# Patient Record
Sex: MALE | Race: BLACK OR AFRICAN AMERICAN | Employment: FULL TIME | URBAN - METROPOLITAN AREA
[De-identification: names, ages, dates, MRNs, and addresses within clinical notes are randomized per-mention and may not be internally consistent; named-entity substitution may affect disease eponyms.]

---

## 2019-06-08 ENCOUNTER — THERAPY VISIT (OUTPATIENT)
Dept: PHYSICAL THERAPY | Facility: CLINIC | Age: 54
End: 2019-06-08
Payer: COMMERCIAL

## 2019-06-08 DIAGNOSIS — M54.2 CERVICAL PAIN: ICD-10-CM

## 2019-06-08 DIAGNOSIS — Z47.89 AFTERCARE FOLLOWING SURGERY OF THE MUSCULOSKELETAL SYSTEM: ICD-10-CM

## 2019-06-08 DIAGNOSIS — M62.81 MUSCLE WEAKNESS OF LEFT UPPER EXTREMITY: ICD-10-CM

## 2019-06-08 PROCEDURE — 97112 NEUROMUSCULAR REEDUCATION: CPT | Mod: GP | Performed by: PHYSICAL THERAPIST

## 2019-06-08 PROCEDURE — 97161 PT EVAL LOW COMPLEX 20 MIN: CPT | Mod: GP | Performed by: PHYSICAL THERAPIST

## 2019-06-08 PROCEDURE — 97110 THERAPEUTIC EXERCISES: CPT | Mod: GP | Performed by: PHYSICAL THERAPIST

## 2019-06-08 NOTE — PROGRESS NOTES
[facility] Initial Evaluation -- Cervical    Evaluation Date: June 8, 2019  Woody Smith is a 53 year old male with a post-op decompression left C6-C7 condition.   Referral: Dr. Yates  Work mechanical stresses: computer work, buisness analyst   Employment status: working with no restrictions. Difficulty using left.   Leisure mechanical stresses: opening doors, likes to lift weights. Arm feels cramping.   Functional disability score (NDI):  See chart  VAS score (0-10): 1  Patient goals/expectations:  Get his arm strength back,     HISTORY:    Present symptoms:  Upper trap / lateral shoulder.  Pain quality (sharp/shooting/stabbing/aching/burning/cramping):   aching.  Paresthesia (yes/no):  Yes, medial three fingers    Present since (onset date): 5/8/19 date of surgery.     Symptoms (improving/unchanging/worsening):  Improving: pain is gone but the weakness unchanged.    Symptoms commenced as a result of: no reason   Condition occurred in the following environment:  unknown    Symptoms at onset (neck/arm/forearm/headache): right / left shoulder and neck, then progressed into his left hand  Constant symptoms (neck/arm/forearm/headache): none  Intermittent symptoms (neck/arm/forearm/headache): left shoulder    Symptoms are made worse with the following: working on the keyboard, sitting for prolonged periods of time   Symptoms are made better with the following: icing    Disturbed sleep (yes/no): no  Number of pillows: 1  Sleeping postures (prone/sup/side R/L): back    Previous episodes (0/1-5/6-10/11+): 0 Year of first episode: this is first     Previous history: surgery C6-C7  Previous treatments: none    Specific Questions: (as reported by the patient)  Dizziness/Tinnitus/Nausea/Swallowing (pos/neg): neg  Gait/Upper Limbs (normal/abnormal): abnormal  Medications (nil/NSAIDS/anlag/steroids/anticoag/other):  Other - High blood pressure  Medical allergies:  none  General health  (excellent/good/fair/poor):  fair  Pertinent medical history:  High blood pressure, Overweight, Sleep disorder/apnea and weakness  Imaging (None/Xray/MRI/Other):  EMG, MRI,   Recent or major surgery (yes/no): yes  Night pain (yes/no): no  Accidents (yes/no): no  Unexplained weight loss (yes/no): no  Barriers at home: none  Other red flags: none    EXAMINATION    Posture:   Sitting (good/fair/poor): fair  Standing (good/fair/poor): good.      Protruded head (yes/no): no    Wry Neck (right/left/nil):  no  Relevant (yes/no):  no     Correction of posture(better/worse/no effect): better  Other observations:  none    Neurological:    Motor Deficit:  C7 weakness, wrist flexion 3/5, tricep extension 3+/5, wrist extension 4/5, finger extension 3/5, shoulder abd 4/5,  strength left 50lbs, right 110lbs     Sensory Deficit:  Hyposenation C7,        Movement Loss:   Esteban Mod Min Nil Pain   Protrusion    x Posterior neck pain   Flexion   x  No pain   Retraction    x No pain   Extension   x  Slight posterior neck discomfort   Lateral flexion R    x No pain   Lateral flexion L   x  Slight pain left shoulder blade   Rotation R    x No pain   Rotation L   x  No pain     Test Movements:   During: produces, abolishes, increases, decreases, no effect, centralizing, peripheralizing  After: better, worse, no better, no worse, no effect, centralized, peripheralized    Pretest symptoms sitting: no pain, just weak   Symptoms During Symptoms After ROM increased ROM decreased No Effect   PRO          Rep PRO          RET No Effect    No Effect         Rep RET No Effect    No Effect    X improved left SB/ Rotation     RET EXT No Effect    No Effect         Rep RET EXT Produces   left cervical pinch No Better    X  slgith  strenght improvement to 60lbs.                Other Tests: noticeable loss of girth in upper / lower arm.      Provisional Classification:  Inconclusive/Other - Post-Surgery    Principle of Management:  Education:   Exercises compliance desk ergonomics    Equipment provided:  Lumbar support to improve posture  Mechanical therapy (Y/N):  yes   Extension principle:  Repeated extension x 10 reps start 3-4 x / day and progress to 6-8 if symptoms improving   Lateral principle:  none  Flexion principle:  none   Other:  UE strengthening    ASSESSMENT/PLAN:    Patient is a 53 year old male with cervical complaints.    Patient has the following significant findings with corresponding treatment plan.                Diagnosis 1:  Cervical pain / left upper extremity weakness s/p C6-C7 cervical decompression  Pain -  hot/cold therapy, manual therapy, education, directional preference exercise and home program  Decreased ROM/flexibility - manual therapy, therapeutic exercise, therapeutic activity and home program  Decreased strength - therapeutic exercise, therapeutic activities and home program  Impaired muscle performance - neuro re-education and home program  Decreased function - therapeutic activities and home program  Impaired posture - neuro re-education, therapeutic activities and home program    Therapy Evaluation Codes:   1) History comprised of:   Personal factors that impact the plan of care:      None.    Comorbidity factors that impact the plan of care are:      Overweight and Weakness.     Medications impacting care: High blood pressure.  2) Examination of Body Systems comprised of:   Body structures and functions that impact the plan of care:      Cervical spine, Elbow, Fingers, Hand, Shoulder and Thumb.   Activity limitations that impact the plan of care are:      Lifting and Working.  3) Clinical presentation characteristics are:   Stable/Uncomplicated.  4) Decision-Making    Low complexity using standardized patient assessment instrument and/or measureable assessment of functional outcome.  Cumulative Therapy Evaluation is: Low complexity.    Previous and current functional limitations:  (See Goal Flow Sheet for this  information)    Short term and Long term goals: (See Goal Flow Sheet for this information)     Communication ability:  Patient appears to be able to clearly communicate and understand verbal and written communication and follow directions correctly.  Treatment Explanation - The following has been discussed with the patient:   RX ordered/plan of care  Anticipated outcomes  Possible risks and side effects  This patient would benefit from PT intervention to resume normal activities.   Rehab potential is excellent.    Frequency:  1 X week, once daily  Duration:  for 6 weeks tapering to 1 X / 2 weeks over 12 weeks  Discharge Plan:  Achieve all LTG.  Independent in home treatment program.  Reach maximal therapeutic benefit.    Please refer to the daily flowsheet for treatment today, total treatment time and time spent performing 1:1 timed codes.

## 2019-06-08 NOTE — LETTER
[facility] PHYSICAL THERAPY  [address]  [facility] [address]  [phone]    Susy 10, 2019    Re: Woody Smith   :   1965  MRN:  [id number]   REFERRING PHYSICIAN:   Umesh Yates    [facility] PHYSICAL [facility]ER[facility]    Date of Initial Evaluation: 19  Visits:  Rxs Used: 1  Reason for Referral:     Cervical pain  Aftercare following surgery of the musculoskeletal system  Muscle weakness of left upper extremity    EVALUATION SUMMARY    [facility] Initial Evaluation -- Cervical    Evaluation Date: 2019  Woody Smith is a 53 year old male with a post-op decompression left C6-C7 condition.   Referral: Dr. Yates  Work mechanical stresses: computer work, buisness analyst   Employment status: working with no restrictions. Difficulty using left.   Leisure mechanical stresses: opening doors, likes to lift weights. Arm feels cramping.   Functional disability score (NDI):  See chart  VAS score (0-10): 1  Patient goals/expectations:  Get his arm strength back,     HISTORY:    Present symptoms:  Upper trap / lateral shoulder.  Pain quality (sharp/shooting/stabbing/aching/burning/cramping):   aching.  Paresthesia (yes/no):  Yes, medial three fingers    Present since (onset date): 19 date of surgery.     Symptoms (improving/unchanging/worsening):  Improving: pain is gone but the weakness unchanged.    Symptoms commenced as a result of: no reason   Condition occurred in the following environment:  unknown    Symptoms at onset (neck/arm/forearm/headache): right / left shoulder and neck, then progressed into his left hand  Constant symptoms (neck/arm/forearm/headache): none  Intermittent symptoms (neck/arm/forearm/headache): left shoulder    Symptoms are made worse with the following: working on the keyboard, sitting for prolonged periods of time   Symptoms are made better with the following: icing    Disturbed sleep (yes/no):  no  Number of pillows: 1  Sleeping postures (prone/sup/side R/L): back    Previous episodes (0/1-5/6-10/11+): 0 Year of first episode: this is first     Previous history: surgery C6-C7  Previous treatments: none    Specific Questions: (as reported by the patient)  Dizziness/Tinnitus/Nausea/Swallowing (pos/neg): neg  Gait/Upper Limbs (normal/abnormal): abnormal  Medications (nil/NSAIDS/anlag/steroids/anticoag/other):  Other - High blood pressure  Medical allergies:  none  General health (excellent/good/fair/poor):  fair  Pertinent medical history:  High blood pressure, Overweight, Sleep disorder/apnea and weakness  Imaging (None/Xray/MRI/Other):  EMG, MRI,   Recent or major surgery (yes/no): yes  Night pain (yes/no): no  Accidents (yes/no): no  Unexplained weight loss (yes/no): no  Barriers at home: none  Other red flags: none    EXAMINATION    Posture:   Sitting (good/fair/poor): fair  Standing (good/fair/poor): good.      Protruded head (yes/no): no    Wry Neck (right/left/nil):  no  Relevant (yes/no):  no     Correction of posture(better/worse/no effect): better  Other observations:  none    Neurological:    Motor Deficit:  C7 weakness, wrist flexion 3/5, tricep extension 3+/5, wrist extension 4/5, finger extension 3/5, shoulder abd 4/5,  strength left 50lbs, right 110lbs     Sensory Deficit:  Hyposenation C7,        Movement Loss:   Esteban Mod Min Nil Pain   Protrusion    x Posterior neck pain   Flexion   x  No pain   Retraction    x No pain   Extension   x  Slight posterior neck discomfort   Lateral flexion R    x No pain   Lateral flexion L   x  Slight pain left shoulder blade   Rotation R    x No pain   Rotation L   x  No pain     Test Movements:   During: produces, abolishes, increases, decreases, no effect, centralizing, peripheralizing  After: better, worse, no better, no worse, no effect, centralized, peripheralized    Pretest symptoms sitting: no pain, just weak   Symptoms During Symptoms After ROM  increased ROM decreased No Effect   PRO          Rep PRO          RET No Effect    No Effect         Rep RET No Effect    No Effect    X improved left SB/ Rotation     RET EXT No Effect    No Effect         Rep RET EXT Produces   left cervical pinch No Better    X  slgith  strenght improvement to 60lbs.        Other Tests: noticeable loss of girth in upper / lower arm.      Provisional Classification:  Inconclusive/Other - Post-Surgery    Principle of Management:  Education:  Exercises compliance desk ergonomics    Equipment provided:  Lumbar support to improve posture  Mechanical therapy (Y/N):  yes   Extension principle:  Repeated extension x 10 reps start 3-4 x / day and progress to 6-8 if symptoms improving   Lateral principle:  none  Flexion principle:  none   Other:  UE strengthening    ASSESSMENT/PLAN:  Patient is a 53 year old male with cervical complaints.    Patient has the following significant findings with corresponding treatment plan.                  Diagnosis 1:  Cervical pain / left upper extremity weakness s/p C6-C7 cervical decompression  Pain -  hot/cold therapy, manual therapy, education, directional preference exercise and home program  Decreased ROM/flexibility - manual therapy, therapeutic exercise, therapeutic activity and home program  Decreased strength - therapeutic exercise, therapeutic activities and home program  Impaired muscle performance - neuro re-education and home program  Decreased function - therapeutic activities and home program  Impaired posture - neuro re-education, therapeutic activities and home program    Therapy Evaluation Codes:   1) History comprised of:   Personal factors that impact the plan of care:      None.    Comorbidity factors that impact the plan of care are:      Overweight and Weakness.     Medications impacting care: High blood pressure.  2) Examination of Body Systems comprised of:   Body structures and functions that impact the plan of care:       Cervical spine, Elbow, Fingers, Hand, Shoulder and Thumb.   Activity limitations that impact the plan of care are:      Lifting and Working.  3) Clinical presentation characteristics are:   Stable/Uncomplicated.  4) Decision-Making    Low complexity using standardized patient assessment instrument and/or measureable assessment of functional outcome.  Cumulative Therapy Evaluation is: Low complexity.    Previous and current functional limitations:  (See Goal Flow Sheet for this information)    Short term and Long term goals: (See Goal Flow Sheet for this information)     Communication ability:  Patient appears to be able to clearly communicate and understand verbal and written communication and follow directions correctly.    Treatment Explanation - The following has been discussed with the patient:   RX ordered/plan of care  Anticipated outcomes  Possible risks and side effects    This patient would benefit from PT intervention to resume normal activities.   Rehab potential is excellent.  Frequency:  1 X week, once daily  Duration:  for 6 weeks tapering to 1 X / 2 weeks over 12 weeks  Discharge Plan:  Achieve all LTG.  Independent in home treatment program.  Reach maximal therapeutic benefit.    Please refer to the daily flowsheet for treatment today, total treatment time and time spent performing 1:1 timed codes.      Thank you for your referral.    INQUIRIES  Therapist: Jamaal Estevez DPT  [facility] PHYSICAL THERAPY  [address]  [facility] [address]  Phone: [phone]  Fax: [phone]

## 2019-06-17 ENCOUNTER — THERAPY VISIT (OUTPATIENT)
Dept: OCCUPATIONAL THERAPY | Facility: CLINIC | Age: 54
End: 2019-06-17
Payer: COMMERCIAL

## 2019-06-17 DIAGNOSIS — R29.898 LEFT HAND WEAKNESS: ICD-10-CM

## 2019-06-17 DIAGNOSIS — M79.642 PAIN OF LEFT HAND: ICD-10-CM

## 2019-06-17 PROCEDURE — 97165 OT EVAL LOW COMPLEX 30 MIN: CPT | Mod: GO | Performed by: OCCUPATIONAL THERAPIST

## 2019-06-17 PROCEDURE — 97110 THERAPEUTIC EXERCISES: CPT | Mod: GO | Performed by: OCCUPATIONAL THERAPIST

## 2019-06-17 PROCEDURE — 97112 NEUROMUSCULAR REEDUCATION: CPT | Mod: GO | Performed by: OCCUPATIONAL THERAPIST

## 2019-06-17 NOTE — LETTER
[facility]  [address]  [address]  [facility] [address]  [phone]    2019    Re: Woody Smith   :   1965  MRN:  [id number]   REFERRING PHYSICIAN:   Umesh Yates    [facility]    Date of Initial Evaluation:  2019  Visits: 1 Rxs Used: 1  Reason for Referral:     Left hand weakness  Pain of left hand    Hand Therapy Initial Evaluation  Current Date:  2019  Referring Physician: Dr. Yates    Diagnosis: Left hand weakness/pain  DOS: 19  Procedure: C6 & C7 decompression  Post:  4w 2d    Subjective:  Woody Smith is a 53 year old right hand dominant male.    Patient reports symptoms of pain, weakness/loss of strength, numbness and tingling  of the left hand which occurred due to above stated surgery. Since onset symptoms are Gradually getting better.  Special tests:  EMG and MRI.  Previous treatment: none.    General health as reported by patient is fair.  Pertinent medical history includes:High Blood Pressure, Numbness/Tingling, Overweight, Sleep Disorder/Apnea, Significant Weakness  Medical allergies:none.  Surgical history: orthopedic: cervical.  Medication history: Anti-inflammatory, High Blood Pressure.    Occupational Profile Information:  Current occupation is   Currently working in normal job without restrictions  Job Tasks: Computer Work, Prolonged Sitting  Prior functional level:  no limitations  Barriers include:none  Mobility: No difficulty  Transportation: drives  Leisure activities/hobbies: reading, gym, choir    Upper Extremity Functional Index Score:  SCORE:   Column Totals: /80: 48   Re: Woody Smith   :   1965  (A lower score indicates greater disability.)    Objective:  Pain Level (Scale 0-10):   2019   At Rest 1/10   With Use 4/10     Pain Description:  Date 2019   Location Hand and radiating into forearm   Pain Quality Aching and Throbbing   Frequency intermittent     Pain is  worst  daytime   Exacerbated by  typing, positional   Relieved by Cold on neck and rest   Progression Gradually improving      ROM:   Pain Report:  + mild    ++ moderate    +++ severe   Wrist 2019   AROM (PROM) Right Left   Extension 60 60   Flexion 75 70   RD 25 15   UD 50 0   Supination 75 85   Pronation 85 90     ROM  Hand 2019    AROM(PROM) Left    Index MP -40/80 /   PIP 0/100 /   DIP /50 /   BANKS     Long MP -50/90 /   PIP -15/95 /   DIP /60 /   BANKS     Ring MP -45/90 /   PIP -35/95 /   DIP /40 /   BANKS     Small MP -30/90 /   PIP -30/95 /   DIP /60 /   BANKS     Re: Woody Smith   :   1965  Lacks finger and thumb  Add/add    Strength   Painfree (Measured in pounds)  Pain Report:  + mild    ++ moderate    +++ severe    2019   Trials Right Left   1  2  3 116  123  121 52  43  44   Average 120 46     Lat Pinch 2019   Trials Right Left   1  2  3 30+ 5   Average       3 Pt Pinch 2019   Trials Right  Left   1  2  3 24 5   Average         Edema:  [x]         None   []         Mild    []         Moderate      []         Severe                    Color/Temperature:     [x]        Normal  []        Abnormal    Palpation:  [x]         Normal        []       Tender       []      Mild         []       Moderate []       Severe       Sensation: []                   WNL throughout all nerve distributions; per patient report    [x]                   Decreased  []        Median    [x]        Ulnar    [x]        Radial nerve distribution  Sensation: Decreased Ulnar Nerve distribution    Numbness/Tingling Level Report  VAS(0-10) 2019   At Rest: 6/10   With Use: 10/10     Special Tests:  Pain Report:  - none    + mild    ++ moderate    +++ severe    2019   Froment's Test positive   Re: Woody Smith   :   1965  Ulnar Nerve MMT: (Scale 0/5)   2019   Finger Adduction 3-/5   Finger Abduction 3-/5   Adductor Pollicis 3-/5        Assessment:  Patient presents with symptoms consistent with diagnosis of hand weakness,  with surgical  intervention.     Patient's limitations or Problem List includes:  Pain, Decreased ROM/motion, Weakness, Sensory disturbance, Decreased , Decreased pinch, Decreased coordination and Decreased dexterity of the left hand which interferes with the patient's ability to perform Self Care Tasks (dressing, bathing), Work Tasks, Recreational Activities and Household Chores as compared to previous level of function.    Rehab Potential:  Excellent - Return to full activity, no limitations    Patient will benefit from skilled Occupational Therapy to increase ROM, overall strength,  strength, pinch strength, forearm strength, coordination, dexterity and sensation and decrease pain to return to previous activity level and resume normal daily tasks and to reach their rehab potential.    Barriers to Learning:  No barrier    Communication Issues:  Patient appears to be able to clearly communicate and understand verbal and written communication and follow directions correctly.    Chart Review: Brief history including review of medical and/or therapy records relating to the presenting problem and Simple history review with patient    Identified Performance Deficits: bathing/showering, dressing, home establishment and management, meal preparation and cleanup, work and leisure activities    Assessment of Occupational Performance:  5 or more Performance Deficits  Clinical Decision Making (Complexity): Low complexity    Treatment Explanation:  The following has been discussed with the patient:  RX ordered/plan of care  Anticipated outcomes  Possible risks and side effects    Frequency:  2-3 X a month, once daily  Duration:  for 6 months  Treatment Plan:  Modalities:  Fluidotherapy and Paraffin  Therapeutic Exercise:  AROM, AAROM, PROM, Tendon Gliding, Reverse Blocking, Place and Hold, Isotonics, Isometrics and  Stabilization  Neuromuscular re-education:  Sensory re-education and Desensitization  Discharge Plan:  Achieve all LTG.  Independent in home treatment program.  Reach maximal therapeutic benefit.  Re: Woody Smith   :   1965  Home Exercise Program:  AROM of fingers and wrist  Desensitization    Next Visit:  Check ROM and advance as indicated    Thank you for your referral.    INQUIRIES  Therapist: YAENTH Lowe/L  [facility]  [address]  [place]   [facility] [address]  Phone: [phone]  Fax: [phone]

## 2019-06-17 NOTE — PROGRESS NOTES
Hand Therapy Initial Evaluation    Current Date:  6/17/2019  Referring Physician: Dr. Yates    Diagnosis: Left hand weakness/pain  DOS: 5/8/19  Procedure: C6 & C7 decompression  Post:  4w 2d    Subjective:  Woody Smith is a 53 year old right hand dominant male.    Patient reports symptoms of pain, weakness/loss of strength, numbness and tingling  of the left hand which occurred due to above stated surgery. Since onset symptoms are Gradually getting better.  Special tests:  EMG and MRI.  Previous treatment: none.    General health as reported by patient is fair.  Pertinent medical history includes:High Blood Pressure, Numbness/Tingling, Overweight, Sleep Disorder/Apnea, Significant Weakness  Medical allergies:none.  Surgical history: orthopedic: cervical.  Medication history: Anti-inflammatory, High Blood Pressure.    Occupational Profile Information:  Current occupation is   Currently working in normal job without restrictions  Job Tasks: Computer Work, Prolonged Sitting  Prior functional level:  no limitations  Barriers include:none  Mobility: No difficulty  Transportation: drives  Leisure activities/hobbies: reading, gym, choir    Upper Extremity Functional Index Score:  SCORE:   Column Totals: /80: 48   (A lower score indicates greater disability.)    Objective:  Pain Level (Scale 0-10):   6/17/2019   At Rest 1/10   With Use 4/10     Pain Description:  Date 6/17/2019   Location Hand and radiating into forearm   Pain Quality Aching and Throbbing   Frequency intermittent     Pain is worst  daytime   Exacerbated by  typing, positional   Relieved by Cold on neck and rest   Progression Gradually improving      ROM:   Pain Report:  + mild    ++ moderate    +++ severe   Wrist 6/17/2019 6/17/2019   AROM (PROM) Right Left   Extension 60 60   Flexion 75 70   RD 25 15   UD 50 0   Supination 75 85   Pronation 85 90     ROM  Hand 6/17/2019    AROM(PROM) Left    Index MP -40/80 /   PIP 0/100 /   DIP /50 /    BANKS     Long MP -50/90 /   PIP -15/95 /   DIP /60 /   BANKS     Ring MP -45/90 /   PIP -35/95 /   DIP /40 /   BANKS     Small MP -30/90 /   PIP -30/95 /   DIP /60 /   BANKS       Lacks finger and thumb  Add/add    Strength   Painfree (Measured in pounds)  Pain Report:  + mild    ++ moderate    +++ severe    6/17/2019 6/17/2019   Trials Right Left   1  2  3 116  123  121 52  43  44   Average 120 46     Lat Pinch 6/17/2019 6/17/2019   Trials Right Left   1  2  3 30+ 5   Average       3 Pt Pinch 6/17/2019 6/17/2019   Trials Right  Left   1  2  3 24 5   Average         Edema:  [x]         None   []         Mild    []         Moderate      []         Severe                    Color/Temperature:     [x]        Normal  []        Abnormal    Palpation:  [x]         Normal        []       Tender       []      Mild         []       Moderate []       Severe       Sensation: []                   WNL throughout all nerve distributions; per patient report    [x]                   Decreased  []        Median    [x]        Ulnar    [x]        Radial nerve distribution  Sensation: Decreased Ulnar Nerve distribution    Numbness/Tingling Level Report  VAS(0-10) 6/17/2019   At Rest: 6/10   With Use: 10/10     Special Tests:  Pain Report:  - none    + mild    ++ moderate    +++ severe    6/17/2019   Froment's Test positive     Ulnar Nerve MMT: (Scale 0/5)   6/17/2019   Finger Adduction 3-/5   Finger Abduction 3-/5   Adductor Pollicis 3-/5       Assessment:  Patient presents with symptoms consistent with diagnosis of hand weakness,  with surgical  intervention.     Patient's limitations or Problem List includes:  Pain, Decreased ROM/motion, Weakness, Sensory disturbance, Decreased , Decreased pinch, Decreased coordination and Decreased dexterity of the left hand which interferes with the patient's ability to perform Self Care Tasks (dressing, bathing), Work Tasks, Recreational Activities and Household Chores as compared to previous  level of function.    Rehab Potential:  Excellent - Return to full activity, no limitations    Patient will benefit from skilled Occupational Therapy to increase ROM, overall strength,  strength, pinch strength, forearm strength, coordination, dexterity and sensation and decrease pain to return to previous activity level and resume normal daily tasks and to reach their rehab potential.    Barriers to Learning:  No barrier    Communication Issues:  Patient appears to be able to clearly communicate and understand verbal and written communication and follow directions correctly.    Chart Review: Brief history including review of medical and/or therapy records relating to the presenting problem and Simple history review with patient    Identified Performance Deficits: bathing/showering, dressing, home establishment and management, meal preparation and cleanup, work and leisure activities    Assessment of Occupational Performance:  5 or more Performance Deficits  Clinical Decision Making (Complexity): Low complexity    Treatment Explanation:  The following has been discussed with the patient:  RX ordered/plan of care  Anticipated outcomes  Possible risks and side effects    Frequency:  2-3 X a month, once daily  Duration:  for 6 months  Treatment Plan:  Modalities:  Fluidotherapy and Paraffin  Therapeutic Exercise:  AROM, AAROM, PROM, Tendon Gliding, Reverse Blocking, Place and Hold, Isotonics, Isometrics and Stabilization  Neuromuscular re-education:  Sensory re-education and Desensitization  Discharge Plan:  Achieve all LTG.  Independent in home treatment program.  Reach maximal therapeutic benefit.      Home Exercise Program:  AROM of fingers and wrist  Desensitization    Next Visit:  Check ROM and advance as indicated

## 2019-06-19 ENCOUNTER — THERAPY VISIT (OUTPATIENT)
Dept: PHYSICAL THERAPY | Facility: CLINIC | Age: 54
End: 2019-06-19
Payer: COMMERCIAL

## 2019-06-19 DIAGNOSIS — M54.2 CERVICAL PAIN: ICD-10-CM

## 2019-06-19 DIAGNOSIS — M62.81 MUSCLE WEAKNESS OF LEFT UPPER EXTREMITY: ICD-10-CM

## 2019-06-19 DIAGNOSIS — Z47.89 AFTERCARE FOLLOWING SURGERY OF THE MUSCULOSKELETAL SYSTEM: ICD-10-CM

## 2019-06-19 PROCEDURE — 97110 THERAPEUTIC EXERCISES: CPT | Mod: GP | Performed by: PHYSICAL THERAPIST

## 2019-06-19 PROCEDURE — 97140 MANUAL THERAPY 1/> REGIONS: CPT | Mod: GP | Performed by: PHYSICAL THERAPIST

## 2019-06-28 ENCOUNTER — THERAPY VISIT (OUTPATIENT)
Dept: PHYSICAL THERAPY | Facility: CLINIC | Age: 54
End: 2019-06-28
Payer: COMMERCIAL

## 2019-06-28 ENCOUNTER — THERAPY VISIT (OUTPATIENT)
Dept: OCCUPATIONAL THERAPY | Facility: CLINIC | Age: 54
End: 2019-06-28
Payer: COMMERCIAL

## 2019-06-28 DIAGNOSIS — M54.2 CERVICAL PAIN: ICD-10-CM

## 2019-06-28 DIAGNOSIS — Z47.89 AFTERCARE FOLLOWING SURGERY OF THE MUSCULOSKELETAL SYSTEM: ICD-10-CM

## 2019-06-28 DIAGNOSIS — R29.898 LEFT HAND WEAKNESS: ICD-10-CM

## 2019-06-28 DIAGNOSIS — M79.642 PAIN OF LEFT HAND: ICD-10-CM

## 2019-06-28 DIAGNOSIS — M62.81 MUSCLE WEAKNESS OF LEFT UPPER EXTREMITY: ICD-10-CM

## 2019-06-28 PROCEDURE — 97110 THERAPEUTIC EXERCISES: CPT | Mod: GP | Performed by: PHYSICAL THERAPIST

## 2019-06-28 PROCEDURE — 97530 THERAPEUTIC ACTIVITIES: CPT | Mod: GO | Performed by: OCCUPATIONAL THERAPIST

## 2019-06-28 PROCEDURE — 97110 THERAPEUTIC EXERCISES: CPT | Mod: GO | Performed by: OCCUPATIONAL THERAPIST

## 2019-06-28 NOTE — PROGRESS NOTES
SOAP Note - Hand Therapy - Objective Information    Current Date:  6/28/2019  Referring Physician: Dr. Yates    Diagnosis: Left hand weakness/pain  DOS: 5/8/19  Procedure: C6 & C7 decompression  Post:  4w 2d    Woody Smith is a 53 year old right hand dominant male.    Patient reports symptoms of pain, weakness/loss of strength, numbness and tingling  of the left hand which occurred due to above stated surgery.     Occupational Profile Information:  Current occupation is     S:  Subjective changes as noted by patient: The hand is getting better. It fatigues but I can do more things before tiring out and getting pain  Functional changes noted by patient: Able to type for 30 min, carry lunch box      O:  Pain Level (Scale 0-10):   6/17/2019 6/28/19   At Rest 1/10 0/10   With Use 4/10 1/10     Pain Description:  Date 6/17/2019   Location Hand and radiating into forearm   Pain Quality Aching and Throbbing   Frequency intermittent     Pain is worst  daytime   Exacerbated by  typing, positional   Relieved by Cold on neck and rest   Progression Gradually improving      ROM:   Pain Report:  + mild    ++ moderate    +++ severe   Wrist 6/17/2019 6/17/2019 6/28/19   AROM (PROM) Right Left Left   Extension 60 60    Flexion 75 70    RD 25 15 20   UD 50 0 20   Supination 75 85    Pronation 85 90      ROM  Hand 6/17/2019 6/28/19   AROM(PROM) Left Left   Index MP -40/80 -32/   PIP 0/100 0/   DIP /50 /   BANKS     Long MP -50/90 -30/   PIP -15/95 0/   DIP /60 /   BANKS     Ring MP -45/90 -10/   PIP -35/95 0/   DIP /40 /   BANKS     Small MP -30/90 -10/   PIP -30/95 0/   DIP /60 /   BANKS       Lacks finger and thumb  Add/add    Strength   Painfree (Measured in pounds)  Pain Report:  + mild    ++ moderate    +++ severe    6/17/2019 6/17/2019 6/28/19   Trials Right Left Left   1  2  3 116  123  121 52  43  44 54  57  55     Average 120 46 55     Lat Pinch 6/17/2019 6/17/2019 6/28/19   Trials Right Left Left   1  2  3  30+ 5 8   Average        3 Pt Pinch 6/17/2019 6/17/2019 6/28/19   Trials Right  Left Left   1  2  3 24 5 7   Average          Sensation: []                   WNL throughout all nerve distributions; per patient report    [x]                   Decreased  []        Median    [x]        Ulnar    [x]        Radial nerve distribution  Sensation: Decreased Ulnar Nerve distribution    Numbness/Tingling Level Report  VAS(0-10) 6/17/2019 6/28/19   At Rest: 6/10 4/10   With Use: 10/10 4/10     Special Tests:  Pain Report:  - none    + mild    ++ moderate    +++ severe    6/17/2019   Froment's Test positive     Ulnar Nerve MMT: (Scale 0/5)   6/17/2019   Finger Adduction 3-/5   Finger Abduction 3-/5   Adductor Pollicis 3-/5     Please refer to the daily flowsheet for treatment provided today.     Home Exercise Program:  AROM of fingers and wrist  Desensitization  Gentle strengthening of hand with nerf ball    Next Visit:  Check ROM and advance as indicated

## 2019-07-01 ENCOUNTER — THERAPY VISIT (OUTPATIENT)
Dept: PHYSICAL THERAPY | Facility: CLINIC | Age: 54
End: 2019-07-01
Payer: COMMERCIAL

## 2019-07-01 DIAGNOSIS — M62.81 MUSCLE WEAKNESS OF LEFT UPPER EXTREMITY: ICD-10-CM

## 2019-07-01 DIAGNOSIS — M54.2 CERVICAL PAIN: ICD-10-CM

## 2019-07-01 DIAGNOSIS — Z47.89 AFTERCARE FOLLOWING SURGERY OF THE MUSCULOSKELETAL SYSTEM: ICD-10-CM

## 2019-07-01 PROCEDURE — 97110 THERAPEUTIC EXERCISES: CPT | Mod: GP | Performed by: PHYSICAL THERAPIST

## 2019-07-01 PROCEDURE — 97140 MANUAL THERAPY 1/> REGIONS: CPT | Mod: GP | Performed by: PHYSICAL THERAPIST

## 2019-07-10 ENCOUNTER — THERAPY VISIT (OUTPATIENT)
Dept: PHYSICAL THERAPY | Facility: CLINIC | Age: 54
End: 2019-07-10
Payer: COMMERCIAL

## 2019-07-10 DIAGNOSIS — M62.81 MUSCLE WEAKNESS OF LEFT UPPER EXTREMITY: ICD-10-CM

## 2019-07-10 DIAGNOSIS — Z47.89 AFTERCARE FOLLOWING SURGERY OF THE MUSCULOSKELETAL SYSTEM: ICD-10-CM

## 2019-07-10 DIAGNOSIS — M54.2 CERVICAL PAIN: ICD-10-CM

## 2019-07-10 PROCEDURE — 97110 THERAPEUTIC EXERCISES: CPT | Mod: GP | Performed by: PHYSICAL THERAPIST

## 2019-07-10 PROCEDURE — 97140 MANUAL THERAPY 1/> REGIONS: CPT | Mod: GP | Performed by: PHYSICAL THERAPIST

## 2019-07-10 NOTE — PROGRESS NOTES
Subjective:  HPI                    Objective:  System    Physical Exam    General     ROS    Assessment/Plan:    PROGRESS  REPORT    Progress reporting period is from 6/17/2019 to 7/10/2019.  Patient has been seen for 5 visits.       SUBJECTIVE  Patient reports stiffness this AM.  Relates this to bed.  Is getting a new bed.  Arm seems to be getting stronger.  Able to type 30 minutes both hand.    Current Pain level: (stiffness).     Initial Pain level: 1/10.   Changes in function:  Yes (See Goal flowsheet attached for changes in current functional level)  Adverse reaction to treatment or activity: None    OBJECTIVE  Changes noted in objective findings:    Objective: CROM: Patient demonstrates full flexion and B rotation.  Reports end range tghtness with L rotation.  Minimal loss extension.   strength: 80 lbs on L.     ASSESSMENT/PLAN  Updated problem list and treatment plan: Diagnosis 1:  Cervical pain/L UE weakness s/p C6-7 cervical decompression  Pain -  manual therapy, directional preference exercise and home program  Decreased ROM/flexibility - manual therapy, therapeutic exercise and home program  Decreased strength - therapeutic exercise, therapeutic activities and home program  Impaired muscle performance - neuro re-education and home program  Decreased function - therapeutic activities and home program  STG/LTGs have been met or progress has been made towards goals:  Yes (See Goal flow sheet completed today.)  Assessment of Progress: The patient's condition is improving.  Self Management Plans:  Patient has been instructed in a home treatment program.    Woody continues to require the following intervention to meet STG and LTG's:  PT    Recommendations:  This patient would benefit from continued therapy.     Frequency:  1 X week, once daily  Duration:  for 7 weeks        Please refer to the daily flowsheet for treatment today, total treatment time and time spent performing 1:1 timed codes.

## 2019-07-10 NOTE — LETTER
[facility] PHYSICAL THERAPY  [address]  [facility] [address]  [phone]    July 10, 2019    Re: Woody Smith   :   1965  MRN:  [id number]   REFERRING PHYSICIAN:   Umesh Yates    [facility] PHYSICAL THERAPY  Date of Initial Evaluation:  19  Visits:  Rxs Used: 5  Reason for Referral:     Cervical pain  Aftercare following surgery of the musculoskeletal system  Muscle weakness of left upper extremity    PROGRESS  REPORT  Progress reporting period is from 2019 to 7/10/2019.    Patient has been seen for 5 visits.       SUBJECTIVE  Patient reports stiffness this AM.  Relates this to bed.  Is getting a new bed.  Arm seems to be getting stronger.  Able to type 30 minutes both hand.    Current Pain level: (stiffness).     Initial Pain level: 1/10.   Changes in function:  Yes (See Goal flowsheet attached for changes in current functional level)  Adverse reaction to treatment or activity: None    OBJECTIVE  Changes noted in objective findings:    Objective: CROM: Patient demonstrates full flexion and B rotation.  Reports end range tghtness with L rotation.  Minimal loss extension.   strength: 80 lbs on L.     ASSESSMENT/PLAN  Updated problem list and treatment plan:     Diagnosis 1:  Cervical pain/L UE weakness s/p C6-7 cervical decompression  Pain -  manual therapy, directional preference exercise and home program  Decreased ROM/flexibility - manual therapy, therapeutic exercise and home program  Decreased strength - therapeutic exercise, therapeutic activities and home program  Impaired muscle performance - neuro re-education and home program  Decreased function - therapeutic activities and home program    STG/LTGs have been met or progress has been made towards goals:  Yes (See Goal flow sheet completed today.)  Assessment of Progress: The patient's condition is improving.    Self Management Plans:  Patient has been instructed in a  home treatment program.    Woody continues to require the following intervention to meet STG and LTG's:  PT    Recommendations:  This patient would benefit from continued therapy.     Frequency:  1 X week, once daily  Duration:  for 7 weeks    Thank you for your referral.    INQUIRIES  Therapist: Herminia Oneill, PT, Cert. MDT  [facility] PHYSICAL THERAPY  [address]  [facility] [address]  Phone: [phone]  Fax: [phone]

## 2019-07-17 ENCOUNTER — THERAPY VISIT (OUTPATIENT)
Dept: PHYSICAL THERAPY | Facility: CLINIC | Age: 54
End: 2019-07-17
Payer: COMMERCIAL

## 2019-07-17 DIAGNOSIS — M54.2 CERVICAL PAIN: ICD-10-CM

## 2019-07-17 DIAGNOSIS — M62.81 MUSCLE WEAKNESS OF LEFT UPPER EXTREMITY: ICD-10-CM

## 2019-07-17 DIAGNOSIS — Z47.89 AFTERCARE FOLLOWING SURGERY OF THE MUSCULOSKELETAL SYSTEM: ICD-10-CM

## 2019-07-17 PROCEDURE — 97140 MANUAL THERAPY 1/> REGIONS: CPT | Mod: GP | Performed by: PHYSICAL THERAPIST

## 2019-07-17 PROCEDURE — 97110 THERAPEUTIC EXERCISES: CPT | Mod: GP | Performed by: PHYSICAL THERAPIST

## 2019-07-22 ENCOUNTER — THERAPY VISIT (OUTPATIENT)
Dept: OCCUPATIONAL THERAPY | Facility: CLINIC | Age: 54
End: 2019-07-22
Payer: COMMERCIAL

## 2019-07-22 DIAGNOSIS — R29.898 LEFT HAND WEAKNESS: ICD-10-CM

## 2019-07-22 DIAGNOSIS — M79.642 PAIN OF LEFT HAND: ICD-10-CM

## 2019-07-22 PROCEDURE — 97112 NEUROMUSCULAR REEDUCATION: CPT | Mod: GO | Performed by: OCCUPATIONAL THERAPIST

## 2019-07-22 PROCEDURE — 97530 THERAPEUTIC ACTIVITIES: CPT | Mod: GO | Performed by: OCCUPATIONAL THERAPIST

## 2019-07-31 ENCOUNTER — THERAPY VISIT (OUTPATIENT)
Dept: PHYSICAL THERAPY | Facility: CLINIC | Age: 54
End: 2019-07-31
Payer: COMMERCIAL

## 2019-07-31 DIAGNOSIS — M62.81 MUSCLE WEAKNESS OF LEFT UPPER EXTREMITY: ICD-10-CM

## 2019-07-31 DIAGNOSIS — M54.2 CERVICAL PAIN: ICD-10-CM

## 2019-07-31 DIAGNOSIS — Z47.89 AFTERCARE FOLLOWING SURGERY OF THE MUSCULOSKELETAL SYSTEM: ICD-10-CM

## 2019-07-31 PROCEDURE — 97140 MANUAL THERAPY 1/> REGIONS: CPT | Mod: GP | Performed by: PHYSICAL THERAPIST

## 2019-07-31 PROCEDURE — 97110 THERAPEUTIC EXERCISES: CPT | Mod: GP | Performed by: PHYSICAL THERAPIST

## 2019-07-31 NOTE — LETTER
[facility] PHYSICAL THERAPY  [address]  [facility] [address]  [phone]    2019    Re: Woody Smith   :   1965  MRN:  [id number]   REFERRING PHYSICIAN:   Umesh Yates    [facility] PHYSICAL THERAPY  Date of Initial Evaluation:  19  Visits:  Rxs Used: 8  Reason for Referral:     Cervical pain  Aftercare following surgery of the musculoskeletal system  Muscle weakness of left upper extremity    DISCHARGE REPORT  Progress reporting period is from 2019 to 2019. Patient was seen for 8 visits.       SUBJECTIVE  Subjective: Patient reports good overall steady improvement.  Reporting increased strength with daily activities  Everything is getting easier to do.  Patient has minimal pain complaints.  Only really notices pain with overhead press at gym.  Is comfortable with home exercise program and will continue independently.    Current Pain level: 0/10.     Initial Pain level: 1/10.   Changes in function:  Yes (See Goal flowsheet attached for changes in current functional level)  Adverse reaction to treatment or activity: None    OBJECTIVE  Changes noted in objective findings:    Objective: CROM: Patient demonstrates full and painfree CROM in all directions.  Strength: 4/5 elbow extenstion on L.  : Improved L  to 100 pounds.  Function: Improved SPADI from 35 to 10.    ASSESSMENT/PLAN  Updated problem list and treatment plan:     Diagnosis 1:  Cervical pain/UE weakness  Pain -  home program  Decreased ROM/flexibility - home program  Decreased strength - home program  Decreased function - home program    STG/LTGs have been met or progress has been made towards goals:  Yes (See Goal flow sheet completed today.)  Assessment of Progress: The patient's condition is improving.    Self Management Plans:  Patient is independent in a home treatment program.    Woody continues to require the following intervention to meet STG  and LTG's:  PT intervention is no longer required to meet STG/LTG.    Recommendations:  This patient is ready to be discharged from therapy and continue their home treatment program.    Thank you for your referral.    INQUIRIES  Therapist:Herminia Oneill, PT   [facility] PHYSICAL THERAPY  [address]  [facility] [address]  Phone: [phone]  Fax: [phone]

## 2019-07-31 NOTE — PROGRESS NOTES
Subjective:  HPI                    Objective:  System    Physical Exam    General     ROS    Assessment/Plan:    DISCHARGE REPORT    Progress reporting period is from 6/8/2019 to 7/31/2019.  Patient was seen for 8 visits.       SUBJECTIVE  Subjective: Patient reports good overall steady improvement.  Reporting increased strength with daily activities  Everything is getting easier to do.  Patient has minimal pain complaints.  Only really notices pain with overhead press at gym.  Is comfortable with home exercise program and will continue independently.    Current Pain level: 0/10.     Initial Pain level: 1/10.   Changes in function:  Yes (See Goal flowsheet attached for changes in current functional level)  Adverse reaction to treatment or activity: None    OBJECTIVE  Changes noted in objective findings:    Objective: CROM: Patient demonstrates full and painfree CROM in all directions.  Strength: 4/5 elbow extenstion on L.  : Improved L  to 100 pounds.  Function: Improved SPADI from 35 to 10.    ASSESSMENT/PLAN  Updated problem list and treatment plan: Diagnosis 1:  Cervical pain/UE weakness  Pain -  home program  Decreased ROM/flexibility - home program  Decreased strength - home program  Decreased function - home program  STG/LTGs have been met or progress has been made towards goals:  Yes (See Goal flow sheet completed today.)  Assessment of Progress: The patient's condition is improving.  Self Management Plans:  Patient is independent in a home treatment program.    Woody continues to require the following intervention to meet STG and LTG's:  PT intervention is no longer required to meet STG/LTG.    Recommendations:  This patient is ready to be discharged from therapy and continue their home treatment program.    Please refer to the daily flowsheet for treatment today, total treatment time and time spent performing 1:1 timed codes.

## 2019-09-04 PROBLEM — R29.898 LEFT HAND WEAKNESS: Status: RESOLVED | Noted: 2019-06-17 | Resolved: 2019-09-04

## 2019-09-04 PROBLEM — M79.642 PAIN OF LEFT HAND: Status: RESOLVED | Noted: 2019-06-17 | Resolved: 2019-09-04

## 2019-09-04 NOTE — PROGRESS NOTES
Pt has not returned for therapy since 7/22/19.  Assume all goals are met to pt satisfaction.  D/C [facility].

## 2020-03-11 ENCOUNTER — HEALTH MAINTENANCE LETTER (OUTPATIENT)
Age: 55
End: 2020-03-11

## 2021-01-03 ENCOUNTER — HEALTH MAINTENANCE LETTER (OUTPATIENT)
Age: 56
End: 2021-01-03

## 2021-04-25 ENCOUNTER — HEALTH MAINTENANCE LETTER (OUTPATIENT)
Age: 56
End: 2021-04-25

## 2021-10-10 ENCOUNTER — HEALTH MAINTENANCE LETTER (OUTPATIENT)
Age: 56
End: 2021-10-10

## 2022-05-21 ENCOUNTER — HEALTH MAINTENANCE LETTER (OUTPATIENT)
Age: 57
End: 2022-05-21

## 2022-09-18 ENCOUNTER — HEALTH MAINTENANCE LETTER (OUTPATIENT)
Age: 57
End: 2022-09-18

## 2023-06-04 ENCOUNTER — HEALTH MAINTENANCE LETTER (OUTPATIENT)
Age: 58
End: 2023-06-04